# Patient Record
Sex: FEMALE | Race: WHITE | NOT HISPANIC OR LATINO | URBAN - METROPOLITAN AREA
[De-identification: names, ages, dates, MRNs, and addresses within clinical notes are randomized per-mention and may not be internally consistent; named-entity substitution may affect disease eponyms.]

---

## 2021-05-07 ENCOUNTER — TELEPHONE ENCOUNTER (OUTPATIENT)
Dept: URBAN - METROPOLITAN AREA CLINIC 92 | Facility: CLINIC | Age: 61
End: 2021-05-07

## 2021-05-21 ENCOUNTER — OFFICE VISIT (OUTPATIENT)
Dept: URBAN - METROPOLITAN AREA CLINIC 128 | Facility: CLINIC | Age: 61
End: 2021-05-21
Payer: COMMERCIAL

## 2021-05-21 ENCOUNTER — WEB ENCOUNTER (OUTPATIENT)
Dept: URBAN - METROPOLITAN AREA CLINIC 128 | Facility: CLINIC | Age: 61
End: 2021-05-21

## 2021-05-21 DIAGNOSIS — L30.9 PERIANAL DERMATITIS: ICD-10-CM

## 2021-05-21 DIAGNOSIS — Z86.010 HISTORY OF COLON POLYPS: ICD-10-CM

## 2021-05-21 PROCEDURE — 99213 OFFICE O/P EST LOW 20 MIN: CPT | Performed by: INTERNAL MEDICINE

## 2021-05-21 RX ORDER — GLUCOSAMINE SULFATE 500 MG
TABLET ORAL
Qty: 0 | Refills: 0 | Status: DISCONTINUED | COMMUNITY
Start: 1900-01-01

## 2021-05-21 RX ORDER — OXYBUTYNIN CHLORIDE 5 MG/1
TABLET, EXTENDED RELEASE ORAL
Qty: 0 | Refills: 0 | Status: DISCONTINUED | COMMUNITY
Start: 1900-01-01

## 2021-05-21 RX ORDER — METFORMIN HYDROCHLORIDE 500 MG/1
TABLET, COATED ORAL
Qty: 0 | Refills: 0 | Status: DISCONTINUED | COMMUNITY
Start: 1900-01-01

## 2021-05-21 RX ORDER — LEVOTHYROXINE SODIUM 75 UG/1
TABLET ORAL
Qty: 0 | Refills: 0 | Status: DISCONTINUED | COMMUNITY
Start: 1900-01-01

## 2021-05-21 RX ORDER — MONTELUKAST SODIUM 4 MG/1
TABLET, CHEWABLE ORAL
Qty: 0 | Refills: 0 | Status: DISCONTINUED | COMMUNITY
Start: 1900-01-01

## 2021-05-21 RX ORDER — TRIAMCINOLONE ACETONIDE 55 UG/1
SPRAY, METERED NASAL
Qty: 1 | Refills: 0 | Status: DISCONTINUED | COMMUNITY
Start: 1900-01-01

## 2021-05-21 RX ORDER — FLUTICASONE PROPIONATE 50 UG/1
SPRAY, METERED NASAL
Qty: 0 | Refills: 0 | Status: DISCONTINUED | COMMUNITY
Start: 1900-01-01

## 2021-05-21 RX ORDER — ROSUVASTATIN CALCIUM 10 MG
TAKE 1 TABLET (10 MG) BY ORAL ROUTE ONCE DAILY FOR 90 DAYS TABLET ORAL 1
Qty: 90 | Refills: 3 | Status: DISCONTINUED | COMMUNITY
Start: 1900-01-01

## 2021-05-21 NOTE — HPI-TODAY'S VISIT:
is here for followup of fecal incontinence, GERD . She had biofeedback in 2018 which worked well.  Colonoscopy Feb 2019- 4 colon polyps  were removed.  She also had anal fissure in the past. She now reports two to three BMS per day.  Her GERD is under good control.

## 2021-05-21 NOTE — PHYSICAL EXAM GASTROINTESTINAL
Abdomen , soft, nontender, nondistended , no guarding or rigidity , no masses palpable , normal bowel sounds , Liver and Spleen  no hepatosplenomegaly , liver nontender , spleen not palpable rectal exam- excessive perianal skin folds with irritation, rectal exam- no evidence of anal fissure or thrombosed hemorrhoids

## 2022-11-02 ENCOUNTER — WEB ENCOUNTER (OUTPATIENT)
Dept: URBAN - METROPOLITAN AREA CLINIC 19 | Facility: CLINIC | Age: 62
End: 2022-11-02

## 2022-11-15 ENCOUNTER — OFFICE VISIT (OUTPATIENT)
Dept: URBAN - METROPOLITAN AREA CLINIC 19 | Facility: CLINIC | Age: 62
End: 2022-11-15

## 2022-11-15 NOTE — HPI-TODAY'S VISIT:
Teetee is a 62-year-old female who presents to the office for colonoscopy.  Admitshistory of colon polyps. Admits/Denies family history of colon cancer.  Denies abdominal pain, diarrhea, constipation, blood in stools, change in bowel habits  Last Colonoscopy was 4/2019 10 mm ascending colon polyp, 3 transverse colon polyps, pathology report showed tubular adenomas, Repeat 3 years  Cardiac History -is taking blood thinners -Sees DR -previous issues with anesthesia -COPD/MARCOS   Previous procedures EGD 4/2019 with Dr. MORALES diffuse gastritis biopsy gastric negative Colonoscopy 4/2019 10 mm ascending colon polyp, 3 transverse colon polyps, pathology report showed tubular adenomas, Repeat 3 years Rectal manometry 2018 abnormal study low sphincter pressure, low maximal rectal sensory threshold and failure to defecate balloon.  Can be seen with a weak sphincter, poor rectal compliance and pelvic floor dysfunction.  Consider endoanal ultrasound and colorectal surgery input

## 2022-11-16 ENCOUNTER — TELEPHONE ENCOUNTER (OUTPATIENT)
Dept: URBAN - METROPOLITAN AREA CLINIC 19 | Facility: CLINIC | Age: 62
End: 2022-11-16

## 2022-12-07 ENCOUNTER — DASHBOARD ENCOUNTERS (OUTPATIENT)
Age: 62
End: 2022-12-07

## 2022-12-07 ENCOUNTER — LAB OUTSIDE AN ENCOUNTER (OUTPATIENT)
Dept: URBAN - METROPOLITAN AREA CLINIC 19 | Facility: CLINIC | Age: 62
End: 2022-12-07

## 2022-12-07 ENCOUNTER — OFFICE VISIT (OUTPATIENT)
Dept: URBAN - METROPOLITAN AREA CLINIC 19 | Facility: CLINIC | Age: 62
End: 2022-12-07
Payer: COMMERCIAL

## 2022-12-07 VITALS
WEIGHT: 143 LBS | SYSTOLIC BLOOD PRESSURE: 122 MMHG | HEIGHT: 68 IN | TEMPERATURE: 97.8 F | DIASTOLIC BLOOD PRESSURE: 82 MMHG | BODY MASS INDEX: 21.67 KG/M2

## 2022-12-07 DIAGNOSIS — K58.9 IBS (IRRITABLE BOWEL SYNDROME): ICD-10-CM

## 2022-12-07 DIAGNOSIS — K59.00 CONSTIPATION, UNSPECIFIED CONSTIPATION TYPE: ICD-10-CM

## 2022-12-07 DIAGNOSIS — R10.32 LLQ ABDOMINAL PAIN: ICD-10-CM

## 2022-12-07 DIAGNOSIS — K62.5 RECTAL BLEEDING: ICD-10-CM

## 2022-12-07 DIAGNOSIS — Z86.010 HISTORY OF COLON POLYPS: ICD-10-CM

## 2022-12-07 DIAGNOSIS — Z12.11 COLON CANCER SCREENING: ICD-10-CM

## 2022-12-07 PROBLEM — 14760008: Status: ACTIVE | Noted: 2022-12-07

## 2022-12-07 PROBLEM — 428283002: Status: ACTIVE | Noted: 2021-05-21

## 2022-12-07 PROCEDURE — 99214 OFFICE O/P EST MOD 30 MIN: CPT | Performed by: NURSE PRACTITIONER

## 2022-12-07 NOTE — HPI-TODAY'S VISIT:
Teetee is a 62-year-old female with DM2, GERD who presents to the office for colon consult. Reporting LLQ ongoing over the past couple of months. Pain is sharp and intemittent. More noticeable after 30 minutes of eating. Hysterectomy last December. Has been having issues with constipation since then, requiring stool softeners. She takes Colace morning and night.  Now having BM once every other day soft consistency stool. Reports prior to this year was having 2-3 BMs /day. Had a little blood on tissue when she wiped a week ago. Has known hemorrhoids and resolved with hemorrhoidal suppository. Saw her GYn who did an exam which was negative, suggested she see GI. She has weaned off Pantoprazole and now currently taking Pepcid only as needed. Has history of hiatal hernia.  Previous procedures EGD 4/2019 nonerosive gastritis biopsy negative colonoscopy 4/17/2019 10 mm polyp descending colon, 3 transverse colon polyps, pathology shows tubular adenomas repeat 3 years

## 2022-12-07 NOTE — PHYSICAL EXAM GASTROINTESTINAL
Abdomen , soft, tender in LLQ, nondistended , normal bowel sounds , Liver and Spleen , no hepatomegaly present

## 2022-12-07 NOTE — PHYSICAL EXAM CONSTITUTIONAL:
pleasant, well nourished, well developed, in no acute distress , ambulating without difficulty None known

## 2022-12-08 ENCOUNTER — TELEPHONE ENCOUNTER (OUTPATIENT)
Dept: URBAN - METROPOLITAN AREA CLINIC 19 | Facility: CLINIC | Age: 62
End: 2022-12-08

## 2022-12-08 LAB
A/G RATIO: 2.2
ABSOLUTE BASOPHILS: 39
ABSOLUTE EOSINOPHILS: 78
ABSOLUTE LYMPHOCYTES: 2288
ABSOLUTE MONOCYTES: 533
ABSOLUTE NEUTROPHILS: 3562
ALBUMIN: 4.7
ALKALINE PHOSPHATASE: 41
ALT (SGPT): 12
AST (SGOT): 12
BASOPHILS: 0.6
BILIRUBIN, TOTAL: 0.3
BUN/CREATININE RATIO: (no result)
BUN: 24
CALCIUM: 9.7
CARBON DIOXIDE, TOTAL: 28
CHLORIDE: 102
CREATININE: 0.71
EGFR: 95
EOSINOPHILS: 1.2
GLOBULIN, TOTAL: 2.1
GLUCOSE: 95
HEMATOCRIT: 37.5
HEMOGLOBIN: 12.4
LIPASE: 38
LYMPHOCYTES: 35.2
MCH: 27.1
MCHC: 33.1
MCV: 81.9
MONOCYTES: 8.2
MPV: 10.4
NEUTROPHILS: 54.8
PLATELET COUNT: 404
POTASSIUM: 4.8
PROTEIN, TOTAL: 6.8
RDW: 14
RED BLOOD CELL COUNT: 4.58
SODIUM: 140
WHITE BLOOD CELL COUNT: 6.5

## 2022-12-09 ENCOUNTER — TELEPHONE ENCOUNTER (OUTPATIENT)
Dept: URBAN - METROPOLITAN AREA CLINIC 19 | Facility: CLINIC | Age: 62
End: 2022-12-09

## 2022-12-09 ENCOUNTER — LAB OUTSIDE AN ENCOUNTER (OUTPATIENT)
Dept: URBAN - METROPOLITAN AREA CLINIC 19 | Facility: CLINIC | Age: 62
End: 2022-12-09

## 2022-12-09 LAB
CREATININE POC: 0.7
PERFORMING LAB: (no result)

## 2022-12-09 RX ORDER — HYOSCYAMINE SULFATE 0.12 MG/1
1 TABLET UNDER THE TONGUE AND ALLOW TO DISSOLVE  AS NEEDED TABLET, ORALLY DISINTEGRATING ORAL
Qty: 90 | Refills: 2 | OUTPATIENT
Start: 2022-12-09 | End: 2023-03-09

## 2023-01-03 ENCOUNTER — ERX REFILL RESPONSE (OUTPATIENT)
Dept: URBAN - METROPOLITAN AREA CLINIC 19 | Facility: CLINIC | Age: 63
End: 2023-01-03

## 2023-01-03 RX ORDER — HYOSCYAMINE SULFATE 0.12 MG/1
1 TABLET UNDER THE TONGUE AND ALLOW TO DISSOLVE  AS NEEDED TABLET, ORALLY DISINTEGRATING ORAL
Qty: 90 | Refills: 2 | OUTPATIENT

## 2023-01-03 RX ORDER — HYOSCYAMINE SULFATE 0.12 MG/1
1 TABLET UNDER THE TONGUE AND ALLOW TO DISSOLVE AS NEEDED SUBLINGUAL THREE TIMES A DAY AS NEEDED FOR PAIN 30 DAYS TABLET ORAL; SUBLINGUAL
Qty: 90 TABLET | Refills: 2 | OUTPATIENT

## 2023-01-04 ENCOUNTER — OFFICE VISIT (OUTPATIENT)
Dept: URBAN - METROPOLITAN AREA SURGERY CENTER 31 | Facility: SURGERY CENTER | Age: 63
End: 2023-01-04

## 2023-01-13 ENCOUNTER — TELEPHONE ENCOUNTER (OUTPATIENT)
Dept: URBAN - METROPOLITAN AREA CLINIC 128 | Facility: CLINIC | Age: 63
End: 2023-01-13

## 2023-01-19 ENCOUNTER — CLAIMS CREATED FROM THE CLAIM WINDOW (OUTPATIENT)
Dept: URBAN - METROPOLITAN AREA SURGERY CENTER 31 | Facility: SURGERY CENTER | Age: 63
End: 2023-01-19

## 2023-01-19 ENCOUNTER — CLAIMS CREATED FROM THE CLAIM WINDOW (OUTPATIENT)
Dept: URBAN - METROPOLITAN AREA SURGERY CENTER 31 | Facility: SURGERY CENTER | Age: 63
End: 2023-01-19
Payer: COMMERCIAL

## 2023-01-19 ENCOUNTER — CLAIMS CREATED FROM THE CLAIM WINDOW (OUTPATIENT)
Dept: URBAN - METROPOLITAN AREA CLINIC 4 | Facility: CLINIC | Age: 63
End: 2023-01-19
Payer: COMMERCIAL

## 2023-01-19 DIAGNOSIS — Z86.010 ADENOMAS PERSONAL HISTORY OF COLONIC POLYPS: ICD-10-CM

## 2023-01-19 DIAGNOSIS — D12.4 BENIGN NEOPLASM OF DESCENDING COLON: ICD-10-CM

## 2023-01-19 DIAGNOSIS — D12.4 ADENOMA OF DESCENDING COLON: ICD-10-CM

## 2023-01-19 PROCEDURE — 88305 TISSUE EXAM BY PATHOLOGIST: CPT | Performed by: PATHOLOGY

## 2023-01-19 PROCEDURE — 45385 COLONOSCOPY W/LESION REMOVAL: CPT | Performed by: INTERNAL MEDICINE

## 2023-01-19 PROCEDURE — G8907 PT DOC NO EVENTS ON DISCHARG: HCPCS | Performed by: INTERNAL MEDICINE
